# Patient Record
Sex: MALE | Race: WHITE | NOT HISPANIC OR LATINO | Employment: OTHER | URBAN - METROPOLITAN AREA
[De-identification: names, ages, dates, MRNs, and addresses within clinical notes are randomized per-mention and may not be internally consistent; named-entity substitution may affect disease eponyms.]

---

## 2021-04-08 DIAGNOSIS — Z23 ENCOUNTER FOR IMMUNIZATION: ICD-10-CM

## 2022-04-18 ENCOUNTER — EVALUATION (OUTPATIENT)
Dept: PHYSICAL THERAPY | Facility: MEDICAL CENTER | Age: 62
End: 2022-04-18
Payer: COMMERCIAL

## 2022-04-18 DIAGNOSIS — M75.02 ADHESIVE CAPSULITIS OF LEFT SHOULDER: Primary | ICD-10-CM

## 2022-04-18 PROCEDURE — 97161 PT EVAL LOW COMPLEX 20 MIN: CPT | Performed by: PHYSICAL THERAPIST

## 2022-04-18 PROCEDURE — 97140 MANUAL THERAPY 1/> REGIONS: CPT | Performed by: PHYSICAL THERAPIST

## 2022-04-18 PROCEDURE — 97110 THERAPEUTIC EXERCISES: CPT | Performed by: PHYSICAL THERAPIST

## 2022-04-18 NOTE — PROGRESS NOTES
PT Evaluation     Today's date: 2022  Patient name: Rosana Villalta  : 1960  MRN: [id number]  Referring provider: Evette Bain DO  Dx:   Encounter Diagnosis     ICD-10-CM    1  Adhesive capsulitis of left shoulder  M75 02                   Assessment/Plan  Patient is a very pleasant 61 yo male who presents with symptoms of left shoulder adhesive capsulitis following scapular fx 5 months ago  Patient is also having symptoms of intercostal strain between left rib 10 and 11 due to compensatory strategy during reaching  Patient has noted poor mechanics of left shoulder as well as weakness that will be addressed with physical therapy services  Patient is expected to progress well with treatment and should be seen 1-2 x a week for 6 weeks  Subjective  Patient states that he has been having poor function of his left shoulder since the fx however this newer rib pain came when he tried to reach with his left shoulder for his grandchild  Patient notes that his pain is sharp at times but is not having resting pain  His left shoulder does not move as he would like it to and he is not able to exercise as desired  Objective  Red Flags: none  Pain:  with certain movements in ribs  Reflexes: 2/5 BUE  Posture: WFL  AROM: normal thoracic and rib motion  Left shoulder limited with ER, FF, and abduction by 25%  PROM: same as AROM  PAROM: diminished mobility of left GH capsular motion on left, ACJ normal, poor scapulothoracic rhythm  Palpation: TTP left 10-11 intercostal space  Special Tests: deferred  Coordination of Movement/strengthe: patient demonstrates difficulty with shoulder movements however is grossly strong  Movement is limited by poor mobility of left GHJ        Goals:  - Pt I with initial HEP in 1-2 visits  - Improve ROM equal to contralateral side in 4-6 weeks  - improved stabilizing structure activation and improved feed forward mechanism with distal activation  - Increase Functional Status Measure measured by FOTO by [facility]  - return to exercise          Precautions: none    See attached HEP

## 2022-04-20 ENCOUNTER — OFFICE VISIT (OUTPATIENT)
Dept: PHYSICAL THERAPY | Facility: MEDICAL CENTER | Age: 62
End: 2022-04-20
Payer: COMMERCIAL

## 2022-04-20 DIAGNOSIS — M75.02 ADHESIVE CAPSULITIS OF LEFT SHOULDER: Primary | ICD-10-CM

## 2022-04-20 PROCEDURE — 97110 THERAPEUTIC EXERCISES: CPT | Performed by: PHYSICAL THERAPIST

## 2022-04-20 PROCEDURE — 97140 MANUAL THERAPY 1/> REGIONS: CPT | Performed by: PHYSICAL THERAPIST

## 2022-04-20 NOTE — PROGRESS NOTES
Daily Note     Today's date: 2022  Patient name: Timoteo Barahona  : 1960  MRN: [id number]  Referring provider: Lamont Davison DO  Dx:   Encounter Diagnosis     ICD-10-CM    1  Adhesive capsulitis of left shoulder  M75 02                   Subjective: patient states that he is feeling pretty good this morning  Was able to perform some of his program at home which went well  Objective: See treatment diary below      Assessment: Tolerated treatment well  Patient exhibited good technique with therapeutic exercises and would benefit from continued PT      Plan: Continue per plan of care        Precautions: none    Daily Treatment Diary     Manual              Shoulder PROM 10min            GHJ mobilization 5min                                                       Exercise Diary              UBE 6            pec stretch [id number]            Towel on wall 10sec x5            Shoulder extension 15x2 blue band            Scapular retractions 15x2 black band            Horizontal abduction 15x2 blue band            Lift offs 15x2                         Foam roller on wall 5 min                                                                                                                                                               Modalities

## 2022-04-26 ENCOUNTER — OFFICE VISIT (OUTPATIENT)
Dept: PHYSICAL THERAPY | Facility: MEDICAL CENTER | Age: 62
End: 2022-04-26
Payer: COMMERCIAL

## 2022-04-26 DIAGNOSIS — M75.02 ADHESIVE CAPSULITIS OF LEFT SHOULDER: Primary | ICD-10-CM

## 2022-04-26 PROCEDURE — 97110 THERAPEUTIC EXERCISES: CPT | Performed by: PHYSICAL THERAPIST

## 2022-04-26 PROCEDURE — 97140 MANUAL THERAPY 1/> REGIONS: CPT | Performed by: PHYSICAL THERAPIST

## 2022-04-26 NOTE — PROGRESS NOTES
Daily Note     Today's date: 2022  Patient name: Deon Norwood  : 1960  MRN: [id number]  Referring provider: Donn Lorenz DO  Dx:   Encounter Diagnosis     ICD-10-CM    1  Adhesive capsulitis of left shoulder  M75 02                   Subjective: patient states that he is feeling pretty good this morning  Was able to perform some of his program at home which went well  Objective: See treatment diary below      Assessment: Tolerated treatment well  Patient exhibited good technique with therapeutic exercises and would benefit from continued PT      Plan: Continue per plan of care        Precautions: none    Daily Treatment Diary     Manual              Shoulder PROM 10min            GHJ mobilization 5min                                                       Exercise Diary              UBE 6            pec stretch [id number]            Towel on wall 10sec x5            Shoulder extension 15x2 blue band            Scapular retractions 15x2 black band            Horizontal abduction 15x2 blue band            Lift offs 15x2             press against wall 10x2            Foam roller on wall 5 min            Shoulder circles 10x5 2lbs                                                                                                                                                  Modalities

## 2022-04-28 ENCOUNTER — OFFICE VISIT (OUTPATIENT)
Dept: PHYSICAL THERAPY | Facility: MEDICAL CENTER | Age: 62
End: 2022-04-28
Payer: COMMERCIAL

## 2022-04-28 DIAGNOSIS — M75.02 ADHESIVE CAPSULITIS OF LEFT SHOULDER: Primary | ICD-10-CM

## 2022-04-28 PROCEDURE — 97140 MANUAL THERAPY 1/> REGIONS: CPT | Performed by: PHYSICAL THERAPIST

## 2022-04-28 PROCEDURE — 97110 THERAPEUTIC EXERCISES: CPT | Performed by: PHYSICAL THERAPIST

## 2022-04-28 NOTE — PROGRESS NOTES
Daily Note     Today's date: 2022  Patient name: Gen Cohn  : 1960  MRN: [id number]  Referring provider: Estelle Angel DO  Dx:   Encounter Diagnosis     ICD-10-CM    1  Adhesive capsulitis of left shoulder  M75 02                   Subjective: patient states that he is feeling pretty good this morning  Was able to perform some of his program at home which went well  Objective: See treatment diary below      Assessment: Tolerated treatment well  Patient exhibited good technique with therapeutic exercises and would benefit from continued PT      Plan: Continue per plan of care        Precautions: none    Daily Treatment Diary     Manual              Shoulder PROM 10min            GHJ mobilization 5min                                                       Exercise Diary              UBE 6            pec stretch [id number]            Towel on wall 10sec x5            Shoulder extension 15x2 blue band            Scapular retractions 15x2 black band            Horizontal abduction 15x2 blue band            Lift offs 15x2             press against wall 10x2            Foam roller on wall 5 min            Shoulder circles 10x5 2lbs            Cane stretches 10sec x5                                                                                                                                     Modalities

## 2022-05-02 ENCOUNTER — OFFICE VISIT (OUTPATIENT)
Dept: PHYSICAL THERAPY | Facility: MEDICAL CENTER | Age: 62
End: 2022-05-02
Payer: COMMERCIAL

## 2022-05-02 DIAGNOSIS — M75.02 ADHESIVE CAPSULITIS OF LEFT SHOULDER: Primary | ICD-10-CM

## 2022-05-02 PROCEDURE — 97110 THERAPEUTIC EXERCISES: CPT | Performed by: PHYSICAL THERAPIST

## 2022-05-02 PROCEDURE — 97140 MANUAL THERAPY 1/> REGIONS: CPT | Performed by: PHYSICAL THERAPIST

## 2022-05-02 NOTE — PROGRESS NOTES
Daily Note     Today's date: 2022  Patient name: Dedra Weinstein  : 1960  MRN: [id number]  Referring provider: Angelo Barboza DO  Dx:   Encounter Diagnosis     ICD-10-CM    1  Adhesive capsulitis of left shoulder  M75 02                   Subjective: patient states that he is feeling pretty good would like to go down to 1x a week and will be stretching on his own  Objective: See treatment diary below      Assessment: Tolerated treatment well  Patient exhibited good technique with therapeutic exercises and would benefit from continued PT  Progressed with stretching and HEP which was all tolerated well without increased pain  Plan: Continue per plan of care        Precautions: none    Daily Treatment Diary     Manual              Shoulder PROM 10min            GHJ mobilization 5min                                                       Exercise Diary              UBE 6            pec stretch [id number]            Towel on wall 10sec x5            Shoulder extension 15x2 blue band            Scapular retractions 15x2 black band            Horizontal abduction 15x2 blue band            Lift offs 15x2             press against wall 10x2            Foam roller on wall 5 min            Shoulder circles 10x5 2lbs            Cane stretches 10sec x5                                                                                                                                     Modalities

## 2022-05-09 ENCOUNTER — OFFICE VISIT (OUTPATIENT)
Dept: PHYSICAL THERAPY | Facility: MEDICAL CENTER | Age: 62
End: 2022-05-09
Payer: COMMERCIAL

## 2022-05-09 DIAGNOSIS — M75.02 ADHESIVE CAPSULITIS OF LEFT SHOULDER: Primary | ICD-10-CM

## 2022-05-09 PROCEDURE — 97140 MANUAL THERAPY 1/> REGIONS: CPT | Performed by: PHYSICAL THERAPIST

## 2022-05-09 PROCEDURE — 97110 THERAPEUTIC EXERCISES: CPT | Performed by: PHYSICAL THERAPIST

## 2022-05-09 NOTE — PROGRESS NOTES
Daily Note     Today's date: 2022  Patient name: Alton Cantu  : 1960  MRN: [id number]  Referring provider: Atif Ahumada DO  Dx:   Encounter Diagnosis     ICD-10-CM    1  Adhesive capsulitis of left shoulder  M75 02                   Subjective: patient states that he is feeling pretty good would like to go down to 1x a week and will be stretching on his own  Objective: See treatment diary below      Assessment: Alton Cantu has been compliant with attending PT and home exercise program since initial eval   Frances Rubio  has made improvements in objective data since initial evalulation and has achieved all goals  Patient reports having returned to their prior level or function  It was mutually agreed to Discharge to home exercise program at this time  Plan: Continue per plan of care        Precautions: none    Daily Treatment Diary     Manual              Shoulder PROM 10min            GHJ mobilization 5min                                                       Exercise Diary              UBE 6            pec stretch [id number]            Towel on wall 10sec x5            Shoulder extension 15x2 blue band            Scapular retractions 15x2 black band            Horizontal abduction 15x2 blue band            Lift offs 15x2             press against wall 10x2            Foam roller on wall 5 min            Shoulder circles 10x5 2lbs            Cane stretches 10sec x5                                                                                                                                     Modalities

## 2023-06-13 ENCOUNTER — EVALUATION (OUTPATIENT)
Dept: PHYSICAL THERAPY | Facility: MEDICAL CENTER | Age: 63
End: 2023-06-13
Payer: COMMERCIAL

## 2023-06-13 DIAGNOSIS — M99.01 CERVICAL SOMATIC DYSFUNCTION: Primary | ICD-10-CM

## 2023-06-13 PROCEDURE — 97140 MANUAL THERAPY 1/> REGIONS: CPT | Performed by: PHYSICAL THERAPIST

## 2023-06-13 PROCEDURE — 97162 PT EVAL MOD COMPLEX 30 MIN: CPT | Performed by: PHYSICAL THERAPIST

## 2023-06-13 NOTE — TELEPHONE ENCOUNTER
Additional Information  • Negative: Is this related to a work injury?     Protocols used: [facility] COMPREHENSIVE SPINE PROGRAM PROTOCOL

## 2023-06-13 NOTE — TELEPHONE ENCOUNTER
Additional Information  • Negative: Is this related to a work injury? • Negative: Is this related to an MVA? • Negative: Are you currently recieving homecare services? • Negative: Has the patient had unexplained weight loss? • Negative: Does the patient have a fever?     Protocols used: [facility] COMPREHENSIVE SPINE PROGRAM PROTOCOL

## 2023-06-13 NOTE — TELEPHONE ENCOUNTER
Additional Information  • Negative: Is this related to a work injury? • Negative: Is this related to an MVA? • Negative: Are you currently recieving homecare services?     Protocols used: [facility] COMPREHENSIVE SPINE PROGRAM PROTOCOL

## 2023-06-13 NOTE — TELEPHONE ENCOUNTER
Additional Information  • Negative: Is this related to a work injury? • Negative: Is this related to an MVA? • Negative: Are you currently recieving homecare services? • Negative: Has the patient had unexplained weight loss? • Negative: Does the patient have a fever? • Negative: Is the patient experiencing urine retention? • Negative: Is the patient experiencing acute drop foot or paralysis? • Negative: Has the patient experienced major trauma? (fall from height, high speed collision, direct blow to spine) and is also experiencing nausea, light-headedness, or loss of consciousness? • Negative: Is the patient experiencing blood in sputum? • Affirmative: Is this a chronic condition?     Protocols used: [facility] COMPREHENSIVE SPINE PROGRAM PROTOCOL

## 2023-06-13 NOTE — TELEPHONE ENCOUNTER
Additional Information  • Negative: Is this related to a work injury? • Negative: Is this related to an MVA? • Negative: Are you currently recieving homecare services? • Negative: Has the patient had unexplained weight loss? • Negative: Does the patient have a fever? • Negative: Is the patient experiencing urine retention?     Protocols used: [facility] COMPREHENSIVE SPINE PROGRAM PROTOCOL

## 2023-06-13 NOTE — TELEPHONE ENCOUNTER

## 2023-06-13 NOTE — TELEPHONE ENCOUNTER
Additional Information  • Negative: Is this related to a work injury? • Negative: Is this related to an MVA? • Negative: Are you currently recieving homecare services? • Negative: Has the patient had unexplained weight loss?     Protocols used:  [facility] COMPREHENSIVE SPINE PROGRAM PROTOCOL

## 2023-06-13 NOTE — TELEPHONE ENCOUNTER
Additional Information  • Negative: Is this related to a work injury? • Negative: Is this related to an MVA? • Negative: Are you currently recieving homecare services? • Negative: Has the patient had unexplained weight loss? • Negative: Does the patient have a fever? • Negative: Is the patient experiencing urine retention? • Negative: Is the patient experiencing acute drop foot or paralysis?     Protocols used: [facility] COMPREHENSIVE SPINE PROGRAM PROTOCOL

## 2023-06-13 NOTE — TELEPHONE ENCOUNTER
Additional Information  • Negative: Is this related to a work injury? • Negative: Is this related to an MVA? • Negative: Are you currently recieving homecare services? • Negative: Has the patient had unexplained weight loss? • Negative: Does the patient have a fever? • Negative: Is the patient experiencing urine retention? • Negative: Is the patient experiencing acute drop foot or paralysis? • Negative: Has the patient experienced major trauma? (fall from height, high speed collision, direct blow to spine) and is also experiencing nausea, light-headedness, or loss of consciousness? • Negative: Is the patient experiencing blood in sputum?     Protocols used: [facility] COMPREHENSIVE SPINE PROGRAM PROTOCOL

## 2023-06-13 NOTE — PROGRESS NOTES
PT Evaluation     Today's date: 2023  Patient name: Alcira Garrett  : 1960  MRN: [id number]  Referring provider: Valentina Rowland DO  Dx:   Encounter Diagnosis     ICD-10-CM    1  Cervical somatic dysfunction  M99 01                      Assessment/Plan  Patient is a pleasant 62 y o  male who presents with symptoms of chronic neck pain and headache   Patient is experiencing cervicalgia and cervicogenic headache secondary to C2-4 ERSR somatic dysfunction as well as C2-3 ERSL   Patients symptoms are consistent with hypomobility and poor postural control   Patient will benefit from skilled PT services to address her issues with manual therapy and stability program  Patient is expected to progress well with treatment 2x a week for 6 weeks  Subjective  Patient states that he was working in his yard 5 weeks ago cutting down a tree and started having increased neck and upper back pain  Patient notes that he has had chronic issues and headaches but since the yard work the symptoms have been consistent and he has had difficulty with his function and movement since  Patient would like to exercise without pain  He has come to [facility]PP[facility] to address his issues of pain and return to normal function  Objective  Red Flags: none  Pain: 3-5/10 with B rotation at end ROM  Reflexes: normal   Posture: forward head with rounded shoulders    AROM: restricted in rotation and lateral flexion B by 60%  PROM: pain with end ROM and reduced by 40%  PAROM: comparable sign and hypomobility detected right C3-5 and left C2-3    Palpation: spasm of right upper trap and LS  Special Tests: - shoulder abduction test left,  - cervical distraction, - ULTTA  Coordination of Movement/strengthe: Patient demonstrates poor activation of Longus Capitus and Longus Coli with loss of feed forward mechanism with reaching tasks    Patient was able to perform activation with cueing    Upper quarter screen:  No changes in sensation and no atrophy noted        Goals  - Pt I with initial HEP in 1-2 visits  - Improve ROM equal to contralateral side in 4 weeks  - Improved Longus Coli and Longus Capitus activation and return of feed forward mechanism   - Increase Functional Status Measure measured by FOTO by [facility]  - return to exercise without pain     Precautions: none         Precautions: none      Manuals             UPA C3-5 right Gr   Iv- 10sec x3                                                   Neuro Re-Ed                                                                                                        Ther Ex                                                                                                                                                                                                   Modalities

## 2023-06-26 ENCOUNTER — OFFICE VISIT (OUTPATIENT)
Dept: PHYSICAL THERAPY | Facility: MEDICAL CENTER | Age: 63
End: 2023-06-26
Payer: COMMERCIAL

## 2023-06-26 DIAGNOSIS — M99.01 CERVICAL SOMATIC DYSFUNCTION: Primary | ICD-10-CM

## 2023-06-26 PROCEDURE — 97110 THERAPEUTIC EXERCISES: CPT | Performed by: PHYSICAL THERAPIST

## 2023-06-26 PROCEDURE — 97112 NEUROMUSCULAR REEDUCATION: CPT | Performed by: PHYSICAL THERAPIST

## 2023-06-26 PROCEDURE — 97140 MANUAL THERAPY 1/> REGIONS: CPT | Performed by: PHYSICAL THERAPIST

## 2023-06-26 NOTE — PROGRESS NOTES
Daily Note     Today's date: 2023  Patient name: Angela Brandt  : 1960  MRN: [id number]  Referring provider: Jazzmine Carreon DO  Dx:   Encounter Diagnosis     ICD-10-CM    1  Cervical somatic dysfunction  M99 01                      Subjective: patient states that he is feeling better since he was here last and happy with his progress so far  Objective: See treatment diary below      Assessment: Tolerated treatment well  Patient exhibited good technique with therapeutic exercises and would benefit from continued PT  Focus on postural control and education  Plan: Continue per plan of care  Precautions: none      Manuals             UPA C3-5 right Gr  Iv- 10sec x3            CTJ UPA B Gr   Iv 10sec x5                                      Neuro Re-Ed                                                                                                        Ther Ex             Thoracic extension over 1/2 roller 10sec x3x3            Ball up wall  10x2            Thoracic rotations 10x2            Wall circles  5x2                                                                                                                                              Modalities

## 2023-06-29 ENCOUNTER — OFFICE VISIT (OUTPATIENT)
Dept: PHYSICAL THERAPY | Facility: MEDICAL CENTER | Age: 63
End: 2023-06-29
Payer: COMMERCIAL

## 2023-06-29 DIAGNOSIS — M99.01 CERVICAL SOMATIC DYSFUNCTION: Primary | ICD-10-CM

## 2023-06-29 PROCEDURE — 97140 MANUAL THERAPY 1/> REGIONS: CPT | Performed by: PHYSICAL THERAPIST

## 2023-06-29 NOTE — PROGRESS NOTES
Daily Note     Today's date: 2023  Patient name: Scot Emanuel  : 1960  MRN: [id number]  Referring provider: Ben Cervantes DO  Dx:   Encounter Diagnosis     ICD-10-CM    1  Cervical somatic dysfunction  M99 01                      Subjective: patient states that he is feeling better not really having much restriction in his motion and no headaches  Objective: See treatment diary below      Assessment: Tolerated treatment well  Patient exhibited good technique with therapeutic exercises and would benefit from continued PT  Focus on postural control and education  Patient will follow up in 2 weeks if all is good discharge at that time  Plan: Continue per plan of care  Precautions: none      Manuals             UPA C3-5 right Gr  Iv- 10sec x3            CTJ UPA B Gr   Iv 10sec x5                                      Neuro Re-Ed                                                                                                        Ther Ex             Thoracic extension over 1/2 roller             Ball up wall              Thoracic rotations             Wall circles                                                                                                                                                Modalities

## 2023-11-22 ENCOUNTER — OFFICE VISIT (OUTPATIENT)
Dept: PHYSICAL THERAPY | Facility: MEDICAL CENTER | Age: 63
End: 2023-11-22
Payer: COMMERCIAL

## 2023-11-22 VITALS — DIASTOLIC BLOOD PRESSURE: 74 MMHG | HEART RATE: 70 BPM | SYSTOLIC BLOOD PRESSURE: 120 MMHG | TEMPERATURE: 97.4 F

## 2023-11-22 DIAGNOSIS — M99.01 CERVICAL SOMATIC DYSFUNCTION: ICD-10-CM

## 2023-11-22 DIAGNOSIS — R29.3 BAD POSTURE: ICD-10-CM

## 2023-11-22 DIAGNOSIS — G89.29 CHRONIC NECK PAIN: Primary | ICD-10-CM

## 2023-11-22 DIAGNOSIS — M54.2 CHRONIC NECK PAIN: Primary | ICD-10-CM

## 2023-11-22 PROCEDURE — 97140 MANUAL THERAPY 1/> REGIONS: CPT | Performed by: PHYSICAL THERAPIST

## 2023-11-22 PROCEDURE — 97162 PT EVAL MOD COMPLEX 30 MIN: CPT | Performed by: PHYSICAL THERAPIST

## 2023-11-22 NOTE — PROGRESS NOTES
PT Evaluation     Today's date: 2023  Patient name: Marcia Borrego  : 1960  MRN: [id number]  Referring provider: Mario Song, PT  Dx:   Encounter Diagnosis     ICD-10-CM    1. Chronic neck pain  M54.2     G89.29       2. Cervical somatic dysfunction  M99.01       3. Bad posture  R29.3                      Assessment/Plan  Patient is a pleasant 59 y.o. male who presents with symptoms of chronic neck pain and headache. Patient is experiencing cervicalgia and cervicogenic headache secondary to C2-4 ERSR somatic dysfunction as well as C2-3 ERSL. Patients symptoms are consistent with hypomobility and poor postural control. Patient will benefit from skilled PT services to address her issues with manual therapy and stability program. Patient is expected to progress well with treatment 2x a week for 6 weeks. Subjective  Patient states that he has been having increased neck and upper back pain over the past 2 weeks and did not want to wait to come in like he did last time. Patient notes that he has had chronic issues and headaches but over the past 2 weeks symptoms have been consistent and he has had difficulty with his function and movement since. Patient would like to exercise without pain. He has come to OPPT to address his issues of pain and return to normal function. Objective  Red Flags: none  Pain: 3-5/10 with B rotation at end ROM  Reflexes: normal   Posture: forward head with rounded shoulders. AROM: restricted in rotation and lateral flexion B by 60%  PROM: pain with end ROM and reduced by 40%  PAROM: comparable sign and hypomobility detected right C3-5 and left C2-3. Palpation: spasm of right upper trap and LS  Special Tests: - shoulder abduction test left,  - cervical distraction, - ULTTA. Coordination of Movement/strengthe: Patient demonstrates poor activation of Longus Capitus and Longus Coli with loss of feed forward mechanism with reaching tasks.    Patient was able to perform activation with cueing. Upper quarter screen:  No changes in sensation and no atrophy noted. Goals  - Pt I with initial HEP in 1-2 visits  - Improve ROM equal to contralateral side in 4 weeks  - Improved Longus Coli and Longus Capitus activation and return of feed forward mechanism.  - Increase Functional Status Measure measured by FOTO by [facility]  - return to exercise without pain     Precautions: none         Precautions: none      Manuals             UPA C3-5 right Gr.  Iv- 10sec x3            MWM snags C2-3 right rotation 10x2                                      Neuro Re-Ed                                                                                                        Ther Ex                                                                                                                                                                                                   Modalities

## 2023-11-22 NOTE — TELEPHONE ENCOUNTER
Additional Information  • Negative: Is this related to a work injury? • Negative: Is this related to an MVA? • Negative: Are you currently recieving homecare services? • Negative: Has the patient had unexplained weight loss? • Negative: Does the patient have a fever? • Negative: Is the patient experiencing urine retention? • Negative: Is the patient experiencing acute drop foot or paralysis? • Negative: Has the patient experienced major trauma? (fall from height, high speed collision, direct blow to spine) and is also experiencing nausea, light-headedness, or loss of consciousness? • Negative: Is the patient experiencing blood in sputum? • Affirmative: Is this a chronic condition?     Protocols used: [email or web address]

## 2023-11-29 ENCOUNTER — OFFICE VISIT (OUTPATIENT)
Dept: PHYSICAL THERAPY | Facility: MEDICAL CENTER | Age: 63
End: 2023-11-29
Payer: COMMERCIAL

## 2023-11-29 DIAGNOSIS — G89.29 CHRONIC NECK PAIN: Primary | ICD-10-CM

## 2023-11-29 DIAGNOSIS — M54.2 CHRONIC NECK PAIN: Primary | ICD-10-CM

## 2023-11-29 DIAGNOSIS — M99.01 CERVICAL SOMATIC DYSFUNCTION: ICD-10-CM

## 2023-11-29 DIAGNOSIS — R29.3 BAD POSTURE: ICD-10-CM

## 2023-11-29 PROCEDURE — 97110 THERAPEUTIC EXERCISES: CPT | Performed by: PHYSICAL THERAPIST

## 2023-11-29 PROCEDURE — 97140 MANUAL THERAPY 1/> REGIONS: CPT | Performed by: PHYSICAL THERAPIST

## 2023-11-29 NOTE — PROGRESS NOTES
Daily Note     Today's date: 2023  Patient name: Danish Montoya  : 1960  MRN: [id number]  Referring provider: Raz Lobo PT  Dx:   Encounter Diagnosis     ICD-10-CM    1. Chronic neck pain  M54.2     G89.29       2. Bad posture  R29.3       3. Cervical somatic dysfunction  M99.01                      Subjective: patient states that he is feeling better since he was here last and happy with his progress so far. Objective: See treatment diary below      Assessment: Tolerated treatment well. Patient exhibited good technique with therapeutic exercises and would benefit from continued PT. Focus on postural control and education. Plan: Continue per plan of care. Precautions: none      Manuals             UPA C3-5 right Gr. Iv- 10sec x3            CTJ UPA B Gr.  Iv 10sec x5                                      Neuro Re-Ed                                                                                                        Ther Ex             Thoracic extension over 1/2 roller 10sec x3x3            Ball up wall  10x2            Thoracic rotations 10x2            Wall circles  5x2                                                                                                                                              Modalities

## 2024-02-27 ENCOUNTER — OFFICE VISIT (OUTPATIENT)
Dept: PHYSICAL THERAPY | Facility: MEDICAL CENTER | Age: 64
End: 2024-02-27
Payer: COMMERCIAL

## 2024-02-27 DIAGNOSIS — M54.2 CHRONIC NECK PAIN: Primary | ICD-10-CM

## 2024-02-27 DIAGNOSIS — G89.29 CHRONIC NECK PAIN: Primary | ICD-10-CM

## 2024-02-27 PROCEDURE — 97140 MANUAL THERAPY 1/> REGIONS: CPT | Performed by: PHYSICAL THERAPIST

## 2024-02-27 PROCEDURE — 97161 PT EVAL LOW COMPLEX 20 MIN: CPT | Performed by: PHYSICAL THERAPIST

## 2024-02-27 NOTE — TELEPHONE ENCOUNTER
Additional Information  • Negative: Is this related to a work injury?  • Negative: Is this related to an MVA?  • Negative: Are you currently recieving homecare services?  • Negative: Has the patient had unexplained weight loss?  • Negative: Does the patient have a fever?  • Negative: Is the patient experiencing urine retention?  • Negative: Is the patient experiencing acute drop foot or paralysis?  • Negative: Has the patient experienced major trauma? (fall from height, high speed collision, direct blow to spine) and is also experiencing nausea, light-headedness, or loss of consciousness?  • Negative: Is the patient experiencing blood in sputum?  • Affirmative: Is this a chronic condition?    Protocols used: [facility] Protocol

## 2024-02-27 NOTE — PROGRESS NOTES
PT Evaluation     Today's date: 2024  Patient name: Daniel Loya  : 1960  MRN: [id number]  Referring provider: Justus Deleon, PT  Dx:   Encounter Diagnosis     ICD-10-CM    1. Chronic neck pain  M54.2     G89.29                      Assessment/Plan  Patient is a pleasant 63 y.o. male who presents with symptoms of chronic neck pain and headache.  Patient is experiencing cervicalgia and cervicogenic headache secondary to C2-4 ERSR somatic dysfunction as well as C2-3 ERSL.  Patients symptoms are consistent with hypomobility and poor postural control.  Patient will benefit from skilled PT services to address her issues with manual therapy and stability program. Patient is expected to progress well with treatment 2x a week for 6 weeks.    Subjective  Patient states that he has had this issue in the past and feels as though therapy really helps when he needs to get rid of the pain.  Symptoms improve quickly and he is happy to come for treatment when needed.  Discomfort and headaches have been worsening over the past 3 weeks.     Objective  Red Flags: none  Pain: 3-5/10 with B rotation at end ROM  Reflexes: normal   Posture: forward head with rounded shoulders.   AROM: restricted in rotation and lateral flexion B by 60%  PROM: pain with end ROM and reduced by 40%  PAROM: comparable sign and hypomobility detected right C3-5 and left C2-3.   Palpation: spasm of right upper trap and LS  Special Tests: - shoulder abduction test left,  - cervical distraction, - ULTTA.  Coordination of Movement/strengthe: Patient demonstrates poor activation of Longus Capitus and Longus Coli with loss of feed forward mechanism with reaching tasks.   Patient was able to perform activation with cueing.  Upper quarter screen:  No changes in sensation and no atrophy noted.       Goals  - Pt I with initial HEP in 1-2 visits  - Improve ROM equal to contralateral side in 4 weeks  - Improved Longus Coli and Longus Capitus activation  and return of feed forward mechanism.  - Increase Functional Status Measure measured by FOTO by [facility]  - return to exercise without pain     Precautions: none         Precautions: none      Manuals             UPA C3-5 right Gr. Iv- 10sec x3                                                   Neuro Re-Ed                                                                                                        Ther Ex                                                                                                                                                                                                   Modalities

## 2024-03-04 ENCOUNTER — OFFICE VISIT (OUTPATIENT)
Dept: PHYSICAL THERAPY | Facility: MEDICAL CENTER | Age: 64
End: 2024-03-04
Payer: COMMERCIAL

## 2024-03-04 DIAGNOSIS — G89.29 CHRONIC NECK PAIN: Primary | ICD-10-CM

## 2024-03-04 DIAGNOSIS — M54.2 CHRONIC NECK PAIN: Primary | ICD-10-CM

## 2024-03-04 PROCEDURE — 97140 MANUAL THERAPY 1/> REGIONS: CPT | Performed by: PHYSICAL THERAPIST

## 2024-03-04 NOTE — PROGRESS NOTES
Daily Note     Today's date: 3/4/2024  Patient name: Daniel Loya  : 1960  MRN: [id number]  Referring provider: Justus Deleon PT  Dx:   Encounter Diagnosis     ICD-10-CM    1. Chronic neck pain  M54.2     G89.29                      Subjective: patient states that he is feeling better not really having much restriction in his motion and no headaches.     Objective: See treatment diary below      Assessment: Tolerated treatment well. Patient exhibited good technique with therapeutic exercises and would benefit from continued PT.  Focus on postural control and education.  Patient will follow up in 2 weeks if all is good discharge at that time.       Plan: Continue per plan of care.      Precautions: none      Manuals             UPA C3-5 right Gr. Iv- 10sec x3            CTJ UPA B Gr. Iv 10sec x5                                      Neuro Re-Ed                                                                                                        Ther Ex             Thoracic extension over 1/2 roller             Ball up wall              Thoracic rotations             Wall circles                                                                                                                                                Modalities

## 2024-03-11 ENCOUNTER — APPOINTMENT (OUTPATIENT)
Dept: PHYSICAL THERAPY | Facility: MEDICAL CENTER | Age: 64
End: 2024-03-11
Payer: COMMERCIAL

## 2025-05-25 NOTE — TELEPHONE ENCOUNTER
Additional Information  • Negative: Is this related to a work injury? • Negative: Is this related to an MVA?     Protocols used: [facility] COMPREHENSIVE SPINE PROGRAM PROTOCOL Patient : Leobardo Reaves Age: 63 2/15/2015     Years since quitting: [id number]  Signed on Workstation ID: [id number]               Sellers's Semi-Sellers's Semi-Sellers's Semi-Sellers'[id number]  Signed on Workstation ID: [id number]   [Gokul Smith MD  05/24/25 [id number]